# Patient Record
Sex: MALE | Race: OTHER
[De-identification: names, ages, dates, MRNs, and addresses within clinical notes are randomized per-mention and may not be internally consistent; named-entity substitution may affect disease eponyms.]

---

## 2019-04-28 ENCOUNTER — HOSPITAL ENCOUNTER (EMERGENCY)
Dept: HOSPITAL 72 - EMR | Age: 39
LOS: 1 days | Discharge: HOME | End: 2019-04-29
Payer: SELF-PAY

## 2019-04-28 VITALS — BODY MASS INDEX: 27.28 KG/M2 | WEIGHT: 180 LBS | HEIGHT: 68 IN

## 2019-04-28 VITALS — DIASTOLIC BLOOD PRESSURE: 81 MMHG | SYSTOLIC BLOOD PRESSURE: 132 MMHG

## 2019-04-28 DIAGNOSIS — S80.211A: ICD-10-CM

## 2019-04-28 DIAGNOSIS — S70.311A: ICD-10-CM
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
## 2019-04-28 DIAGNOSIS — S90.512A: Primary | ICD-10-CM

## 2019-04-28 DIAGNOSIS — S90.511A: ICD-10-CM
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
## 2019-04-28 PROCEDURE — 96374 THER/PROPH/DIAG INJ IV PUSH: CPT

## 2019-04-28 PROCEDURE — 99284 EMERGENCY DEPT VISIT MOD MDM: CPT

## 2019-04-28 PROCEDURE — 73610 X-RAY EXAM OF ANKLE: CPT

## 2019-04-28 PROCEDURE — 73562 X-RAY EXAM OF KNEE 3: CPT
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
## 2019-04-29 VITALS — SYSTOLIC BLOOD PRESSURE: 132 MMHG | DIASTOLIC BLOOD PRESSURE: 81 MMHG
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
## 2019-04-29 NOTE — NUR
ER DISCHARGE NOTE:

Patient is cleared to be discharged per ERMD, pt is aox4, on room air, with 
stable vital signs. pt was given dc and prescription instructions, pt was able 
to verbalize understanding, pt id band and iv site removed without 
complications. pt is able to ambulate with steady gait. pt took all belongings. 
Pt's wife will  Pt.